# Patient Record
Sex: MALE | Race: WHITE | Employment: OTHER | URBAN - METROPOLITAN AREA
[De-identification: names, ages, dates, MRNs, and addresses within clinical notes are randomized per-mention and may not be internally consistent; named-entity substitution may affect disease eponyms.]

---

## 2017-08-04 ENCOUNTER — APPOINTMENT (OUTPATIENT)
Dept: CT IMAGING | Age: 82
End: 2017-08-04
Attending: PHYSICIAN ASSISTANT
Payer: MEDICARE

## 2017-08-04 ENCOUNTER — HOSPITAL ENCOUNTER (EMERGENCY)
Age: 82
Discharge: HOME | End: 2017-08-04
Attending: EMERGENCY MEDICINE
Payer: MEDICARE

## 2017-08-04 VITALS
SYSTOLIC BLOOD PRESSURE: 180 MMHG | DIASTOLIC BLOOD PRESSURE: 92 MMHG | WEIGHT: 185 LBS | TEMPERATURE: 97.8 F | BODY MASS INDEX: 27.4 KG/M2 | OXYGEN SATURATION: 99 % | RESPIRATION RATE: 16 BRPM | HEART RATE: 81 BPM | HEIGHT: 69 IN

## 2017-08-04 DIAGNOSIS — T07.XXXA ABRASIONS OF MULTIPLE SITES: Primary | ICD-10-CM
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
## 2017-08-04 DIAGNOSIS — S09.90XA MINOR HEAD INJURY, INITIAL ENCOUNTER: ICD-10-CM

## 2017-08-04 PROCEDURE — 90715 TDAP VACCINE 7 YRS/> IM: CPT | Performed by: PHYSICIAN ASSISTANT

## 2017-08-04 PROCEDURE — 74011250636 HC RX REV CODE- 250/636: Performed by: PHYSICIAN ASSISTANT

## 2017-08-04 PROCEDURE — 99284 EMERGENCY DEPT VISIT MOD MDM: CPT

## 2017-08-04 PROCEDURE — 70450 CT HEAD/BRAIN W/O DYE: CPT

## 2017-08-04 PROCEDURE — 90471 IMMUNIZATION ADMIN: CPT

## 2017-08-04 PROCEDURE — 74011250637 HC RX REV CODE- 250/637: Performed by: PHYSICIAN ASSISTANT

## 2017-08-04 RX ORDER — LEVOTHYROXINE SODIUM 50 UG/1
50 TABLET ORAL
COMMUNITY

## 2017-08-04 RX ORDER — CARVEDILOL 6.25 MG/1
TABLET ORAL 2 TIMES DAILY WITH MEALS
COMMUNITY

## 2017-08-04 RX ORDER — SIMVASTATIN 40 MG/1
TABLET, FILM COATED ORAL
COMMUNITY

## 2017-08-04 RX ORDER — ALLOPURINOL 100 MG/1
100 TABLET ORAL DAILY
COMMUNITY

## 2017-08-04 RX ORDER — WARFARIN 4 MG/1
4 TABLET ORAL DAILY
COMMUNITY

## 2017-08-04 RX ADMIN — BACITRACIN ZINC, NEOMYCIN SULFATE, POLYMYXIN B SULFATE 1 PACKET: 3.5; 5000; 4 OINTMENT TOPICAL at 11:26

## 2017-08-04 RX ADMIN — TETANUS TOXOID, REDUCED DIPHTHERIA TOXOID AND ACELLULAR PERTUSSIS VACCINE, ADSORBED 0.5 ML: 5; 2.5; 8; 8; 2.5 SUSPENSION INTRAMUSCULAR at 11:26

## 2017-08-04 NOTE — DISCHARGE INSTRUCTIONS
Scrapes (Abrasions): Care Instructions  Your Care Instructions  Scrapes (abrasions) are wounds where your skin has been rubbed or torn off. Most scrapes do not go deep into the skin, but some may remove several layers of skin. Scrapes usually don't bleed much, but they may ooze pinkish fluid. Scrapes on the head or face may appear worse than they are. They may bleed a lot because of the good blood supply to this area. Most scrapes heal well and may not need a bandage. They usually heal within 3 to 7 days. A large, deep scrape may take 1 to 2 weeks or longer to heal. A scab may form on some scrapes. Follow-up care is a key part of your treatment and safety. Be sure to make and go to all appointments, and call your doctor if you are having problems. It's also a good idea to know your test results and keep a list of the medicines you take. How can you care for yourself at home? · If your doctor told you how to care for your wound, follow your doctor's instructions. If you did not get instructions, follow this general advice:  ¨ Wash the scrape with clean water 2 times a day. Don't use hydrogen peroxide or alcohol, which can slow healing. ¨ You may cover the scrape with a thin layer of petroleum jelly, such as Vaseline, and a nonstick bandage. ¨ Apply more petroleum jelly and replace the bandage as needed. · Prop up the injured area on a pillow anytime you sit or lie down during the next 3 days. Try to keep it above the level of your heart. This will help reduce swelling. · Be safe with medicines. Take pain medicines exactly as directed. ¨ If the doctor gave you a prescription medicine for pain, take it as prescribed. ¨ If you are not taking a prescription pain medicine, ask your doctor if you can take an over-the-counter medicine. When should you call for help?   Call your doctor now or seek immediate medical care if:  · You have signs of infection, such as:  ¨ Increased pain, swelling, warmth, or redness around the scrape. ¨ Red streaks leading from the scrape. ¨ Pus draining from the scrape. ¨ A fever. · The scrape starts to bleed, and blood soaks through the bandage. Oozing small amounts of blood is normal.  Watch closely for changes in your health, and be sure to contact your doctor if the scrape is not getting better each day. Where can you learn more? Go to http://karlos-jorje.info/. Enter A374 in the search box to learn more about \"Scrapes (Abrasions): Care Instructions. \"  Current as of: March 20, 2017  Content Version: 11.3  © 1180-9018 [employer]. Care instructions adapted under license by [employer] (which disclaims liability or warranty for this information). If you have questions about a medical condition or this instruction, always ask your healthcare professional. [place] [address] any warranty or liability for your use of this information. Bruises: Care Instructions  Your Care Instructions    Bruises occur when small blood vessels under the skin tear or rupture, most often from a twist, bump, or fall. Blood leaks into tissues under the skin and causes a black-and-blue spot that often turns colors, including purplish black, reddish blue, or yellowish green, as the bruise heals. Bruises hurt, but most are not serious and will go away on their own within 2 to 4 weeks. Sometimes, gravity causes them to spread down the body. A leg bruise usually will take longer to heal than a bruise on the face or arms. Follow-up care is a key part of your treatment and safety. Be sure to make and go to all appointments, and call your doctor if you are having problems. Its also a good idea to know your test results and keep a list of the medicines you take. How can you care for yourself at home? · Take pain medicines exactly as directed. ¨ If the doctor gave you a prescription medicine for pain, take it as prescribed.   ¨ If you are not taking a prescription pain medicine, ask your doctor if you can take an over-the-counter medicine. · Put ice or a cold pack on the area for 10 to 20 minutes at a time. Put a thin cloth between the ice and your skin. · If you can, prop up the bruised area on pillows as much as possible for the next few days. Try to keep the bruise above the level of your heart. When should you call for help? Call your doctor now or seek immediate medical care if:  · You have signs of infection, such as:  ¨ Increased pain, swelling, warmth, or redness. ¨ Red streaks leading from the bruise. ¨ Pus draining from the bruise. ¨ A fever. · You have a bruise on your leg and signs of a blood clot, such as:  ¨ Increasing redness and swelling along with warmth, tenderness, and pain in the bruised area. ¨ Pain in your calf, back of the knee, thigh, or groin. ¨ Redness and swelling in your leg or groin. · Your pain gets worse. Watch closely for changes in your health, and be sure to contact your doctor if:  · You do not get better as expected. Where can you learn more? Go to http://karlos-jorje.info/. Enter [phone] in the search box to learn more about \"Bruises: Care Instructions. \"  Current as of: March 20, 2017  Content Version: 11.3  © 5331-2542 [employer]. Care instructions adapted under license by [employer] (which disclaims liability or warranty for this information). If you have questions about a medical condition or this instruction, always ask your healthcare professional. [place] [address] any warranty or liability for your use of this information. Head Injury: Care Instructions  Your Care Instructions  Most injuries to the head are minor. Bumps, cuts, and scrapes on the head and face usually heal well and can be treated the same as injuries to other parts of the body.   Although it's rare, once in a while a more serious problem shows up after you are home. So it's good to be on the lookout for symptoms for a day or two. Follow-up care is a key part of your treatment and safety. Be sure to make and go to all appointments, and call your doctor if you are having problems. It's also a good idea to know your test results and keep a list of the medicines you take. How can you care for yourself at home? · Follow your doctor's instructions. He or she will tell you if you need someone to watch you closely for the next 24 hours or longer. · Take it easy for the next few days or more if you are not feeling well. · Ask your doctor when it's okay for you to go back to activities like driving a car, riding a bike, or operating machinery. When should you call for help? Call 911 anytime you think you may need emergency care. For example, call if:  · You have a seizure. · You passed out (lost consciousness). · You are confused or can't stay awake. Call your doctor now or seek immediate medical care if:  · You have new or worse vomiting. · You feel less alert. · You have new weakness or numbness in any part of your body. Watch closely for changes in your health, and be sure to contact your doctor if:  · You do not get better as expected. · You have new symptoms, such as headaches, trouble concentrating, or changes in mood. Where can you learn more? Go to http://kalros-jorje.info/. Enter L642 in the search box to learn more about \"Head Injury: Care Instructions. \"  Current as of: October 14, 2016  Content Version: 11.3  © 3645-0295 [employer]. Care instructions adapted under license by [employer] (which disclaims liability or warranty for this information). If you have questions about a medical condition or this instruction, always ask your healthcare professional. [address] any warranty or liability for your use of this information.       Skin Tears: Care Instructions  Your Care Instructions  As we get older, our skin gets drier and more fragile. Sometimes this can cause the outer layers of skin to split and tear open. Skin tears are treated in different ways. In some cases, doctors use pieces of tape called Steri-Strips to pull the skin together and help it heal. Other times, it's best to leave the tear open and cover it with a special wound-care bandage. Skin tears are usually not serious. They usually heal in a few weeks. But how long you take to heal depends on your body and the type of tear you have. Sometimes the torn piece of skin is used to protect the wound while it heals. But that piece of skin does not heal. It may fall off on its own. Or the doctor may remove it. As your tear heals, it's important to keep it clean to help prevent infection. The doctor has checked you carefully, but problems can develop later. If you notice any problems or new symptoms, get medical treatment right away. Follow-up care is a key part of your treatment and safety. Be sure to make and go to all appointments, and call your doctor if you are having problems. It's also a good idea to know your test results and keep a list of the medicines you take. How can you care for yourself at home? · If you have pain, ask your doctor if you can take an over-the-counter pain medicine, such as acetaminophen (Tylenol), ibuprofen (Advil, Motrin), or naproxen (Aleve). Be safe with medicines. Read and follow all instructions on the label. · If you have a bandage, follow your doctor's instructions for changing it. · If you have Steri-Strips, leave them on until they fall off. · Follow your doctor's instructions about bathing. · Gently wash the skin tear with plain water 2 times a day. Do not rub the area. · Let the area air dry. Or you can pat it carefully with a soft towel. When should you call for help?   Call your doctor now or seek immediate medical care if:  · You have signs of infection, such as:  ¨ Increased pain, swelling, warmth, or redness around the tear. ¨ Red streaks leading from the tear. ¨ Pus draining from the tear. ¨ A fever. · The tear starts to bleed a lot. Small amounts of blood are normal.  Watch closely for changes in your health, and be sure to contact your doctor if:  · You do not get better as expected. Where can you learn more? Go to VtagO.be  Enter M822 in the search box to learn more about \"Skin Tears: Care Instructions. \"   © 1995-4879 [employer] Inc[employer]. Care instructions adapted under license by [facility] (which disclaims liability or warranty for this information). This care instruction is for use with your licensed healthcare professional. If you have questions about a medical condition or this instruction, always ask your healthcare professional. [address] any warranty or liability for your use of this information.   Content Version: 77.3.564359; Current as of: May 22, 2015

## 2017-08-04 NOTE — ED NOTES
Patient discharged to [facility] from [facility] to give ride back to dealership. Patient walked out to car with stable gait.

## 2017-08-04 NOTE — ED NOTES
Cleaned and dressed patient's wounds as ordered by PA. Patient educated on importance of following up with PCP.

## 2017-08-04 NOTE — ED PROVIDER NOTES
HPI Comments: Cameron Albarran is a 80 y.o. male with no pertinent PMHx who presents by EMS to the ED for evaluation s/p a ground level fall earlier this afternoon. Pt explains that he is unable to remember the cause of his fall but he believes that he tripped while at the [employer]. Pt notes that he is also unable to remember the year, the day, or the President of the [address]. Pt is also unable to remember the date of his last Tetanus vaccination. Pt specifically denies LOC. Per EMS, responders were called to the scene of a [employer] dealership after the pt experienced an unwitnessed fall, receiving multiple lacerations to his face and arms. Social hx: - Tobacco use, +(occ.) EtOH use, - Illicit drug use    PCP: PROVIDER UNKNOWN    There are no other complaints, changes or physical findings at this time. The history is provided by the patient and the EMS personnel. No  was used. History reviewed. No pertinent past medical history. History reviewed. No pertinent surgical history. History reviewed. No pertinent family history. Social History     Social History    Marital status: SINGLE     Spouse name: N/A    Number of children: N/A    Years of education: N/A     Occupational History    Not on file. Social History Main Topics    Smoking status: Never Smoker    Smokeless tobacco: Never Used    Alcohol use Yes      Comment: Social    Drug use: No    Sexual activity: No     Other Topics Concern    Not on file     Social History Narrative    No narrative on file         ALLERGIES: Review of patient's allergies indicates no known allergies. Review of Systems   Constitutional: Negative. Negative for appetite change, chills, fatigue and fever. HENT: Negative. Negative for congestion, rhinorrhea, sinus pressure and sore throat. Eyes: Negative. Respiratory: Negative. Negative for cough, choking, chest tightness, shortness of breath and wheezing. Cardiovascular: Negative. Negative for chest pain, palpitations and leg swelling. Gastrointestinal: Negative for abdominal pain, constipation, diarrhea, nausea and vomiting. Endocrine: Negative. Genitourinary: Negative. Negative for difficulty urinating, dysuria, flank pain and urgency. Musculoskeletal: Negative. Skin: Positive for wound. Neurological: Negative. Negative for dizziness, speech difficulty, weakness, light-headedness, numbness and headaches. Psychiatric/Behavioral: Negative. All other systems reviewed and are negative. Patient Vitals for the past 12 hrs:   Temp Pulse Resp BP SpO2   08/04/17 1233 - 81 16 (!) 180/92 99 %   08/04/17 1106 97.8 °F (36.6 °C) 76 18 (!) 201/92 95 %             Physical Exam   Constitutional: He is oriented to person, place, and time. He appears well-developed and well-nourished. No distress. HENT:   Right Ear: External ear normal.   Left Ear: External ear normal.   Nose: Nose normal.   Mouth/Throat: Oropharynx is clear and moist. No oropharyngeal exudate. Eyes: Conjunctivae and EOM are normal. Pupils are equal, round, and reactive to light. Right eye exhibits no discharge. Left eye exhibits no discharge. No scleral icterus. Neck: Normal range of motion. Neck supple. No JVD present. No tracheal deviation present. Cardiovascular: Normal rate, regular rhythm, normal heart sounds and intact distal pulses. Exam reveals no gallop and no friction rub. No murmur heard. Pulmonary/Chest: Effort normal and breath sounds normal. No respiratory distress. He has no wheezes. He has no rales. He exhibits no tenderness. Abdominal: Soft. Bowel sounds are normal. He exhibits no distension and no mass. There is no tenderness. There is no rebound and no guarding. Musculoskeletal: Normal range of motion. He exhibits no edema or tenderness. Lymphadenopathy:     He has no cervical adenopathy.    Neurological: He is alert and oriented to person, place, and time. He has normal reflexes. No cranial nerve deficit. He exhibits normal muscle tone. Coordination normal.   Skin: Skin is warm and dry. He is not diaphoretic. Abrasion to left zygoma, left forehead, and lower lip   Psychiatric: He has a normal mood and affect. His behavior is normal. Judgment and thought content normal.   Nursing note and vitals reviewed. MDM  Number of Diagnoses or Management Options  Abrasions of multiple sites:   Minor head injury, initial encounter:   Diagnosis management comments: DDx: minor head trauma, abrasion, skin tear       Amount and/or Complexity of Data Reviewed  Tests in the radiology section of CPT®: ordered and reviewed  Obtain history from someone other than the patient: yes (EMS)  Review and summarize past medical records: yes    Patient Progress  Patient progress: stable    ED Course       Procedures    IMAGING RESULTS:  CT HEAD WO CONT   Final Result   Study Result      EXAM:  CT HEAD WO CONT  Oral history: Global loss of function, head trauma  INDICATION:   glf with head trauma     COMPARISON: None.     CONTRAST:  None.     TECHNIQUE: Unenhanced CT of the head was performed using 5 mm images. Brain and  bone windows were generated. CT dose reduction was achieved through use of a  standardized protocol tailored for this examination and automatic exposure  control for dose modulation.       FINDINGS:  Sulcal and ventricular prominence. . Scattered hypodensities in the cerebral  white matter. . 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 There is no  intracranial hemorrhage, extra-axial collection, mass, mass effect or midline  shift. The basilar cisterns are open. No acute infarct is identified. The bone  windows demonstrate no abnormalities.  The visualized portions of the paranasal  sinuses and mastoid air cells are clear.     IMPRESSION  IMPRESSION:      No acute intracranial process          MEDICATIONS GIVEN:  Medications   diph,Pertuss(AC),Tet Vac-PF (BOOSTRIX) suspension 0.5 mL (0.5 mL IntraMUSCular Given 8/4/17 1126)   neomycin-bacitracnZn-polymyxnB (NEOSPORIN) ointment 1 Packet (1 Packet Topical Given 8/4/17 1126)       IMPRESSION:  1. Abrasions of multiple sites    2. Minor head injury, initial encounter    3. Fall, initial encounter        PLAN:  1. Follow-up Information     Follow up With Details Comments Contact Info    Provider Unknown In 2 days As needed Patient not available to ask          Return to ED if worse     DISCHARGE NOTE  12:49 PM  The patient has been re-evaluated and is ready for discharge. Reviewed available results with patient. Counseled patient on diagnosis and care plan. Patient has expressed understanding, and all questions have been answered. Patient agrees with plan and agrees to follow up as recommended, or return to the ED if their symptoms worsen. Discharge instructions have been provided and explained to the patient, along with reasons to return to the ED. ATTESTATION:  This note is prepared by Uma Arguello, acting as Scribe for [employer]. [email or web address] : The scribe's documentation has been prepared under my direction and personally reviewed by me in its entirety. I confirm that the note above accurately reflects all work, treatment, procedures, and medical decision making performed by me.

## 2017-08-04 NOTE — ED NOTES
Patient was picked up by EMS becuase he had a unwitnessed fall while at the car dealership getting his car looked at. Patient states \"I can not remember the month and Fernandodiony Patterson is not the president anymore\" Unaware if this is baseline for the patient, VELASQUEZ [place] aware. Patient states \"I am not sure if I tripped or just fell. \" He denies dizziness and losing consciousness. En route   Patient requests that his nephew, Yazmin Varma is called [phone].

## 2017-08-04 NOTE — ED NOTES
VELASQUEZ Giraldo reviewed discharge instructions with the patient. The patient verbalized understanding. Patient is ready to be discharged home, however, is currently awaiting transportation. Patient has no further complaints. Sarah livingston has been contacted to have patient transported back to his vehicle at the [facility].